# Patient Record
Sex: FEMALE
[De-identification: names, ages, dates, MRNs, and addresses within clinical notes are randomized per-mention and may not be internally consistent; named-entity substitution may affect disease eponyms.]

---

## 2019-02-06 ENCOUNTER — HOSPITAL ENCOUNTER (EMERGENCY)
Dept: HOSPITAL 5 - ED | Age: 29
Discharge: HOME | End: 2019-02-06
Payer: SELF-PAY

## 2019-02-06 VITALS — DIASTOLIC BLOOD PRESSURE: 110 MMHG | SYSTOLIC BLOOD PRESSURE: 132 MMHG

## 2019-02-06 DIAGNOSIS — S39.012A: Primary | ICD-10-CM

## 2019-02-06 DIAGNOSIS — F17.200: ICD-10-CM
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
## 2019-02-06 DIAGNOSIS — Z88.2: ICD-10-CM

## 2019-02-06 LAB
BILIRUB UR QL STRIP: (no result)
BLOOD UR QL VISUAL: (no result)
MUCOUS THREADS #/AREA URNS HPF: (no result) /HPF
PH UR STRIP: 5 [PH] (ref 5–7)
PROT UR STRIP-MCNC: (no result) MG/DL
RBC #/AREA URNS HPF: 10 /HPF (ref 0–6)
UROBILINOGEN UR-MCNC: 4 MG/DL (ref ?–2)
WBC #/AREA URNS HPF: 2 /HPF (ref 0–6)

## 2019-02-06 PROCEDURE — 81001 URINALYSIS AUTO W/SCOPE: CPT

## 2019-02-06 PROCEDURE — 72100 X-RAY EXAM L-S SPINE 2/3 VWS: CPT

## 2019-02-06 NOTE — EMERGENCY DEPARTMENT REPORT
ED Back Pain/Injury HPI





- General


Chief Complaint: Back Pain/Injury


Stated Complaint: BACK PAIN


Time Seen by Provider: 19 18:51


Source: patient


Limitations: No Limitations





- History of Present Illness


Initial Comments: 


Patient's a 71-year-old white female who presents for low back pain right the 

past month patient denies fall or injury or trauma denies dysuria frequency or 

urgency patient does have 3 small children that she has to carry upstairs 

numbness or weakness no paralysis loss or decrease in bowel or bladder function 

was a lumbar series x-ray ordered in triage were UA is a period was 2 weeks ago 

will rule out UTI and treated appropriately





MD Complaint: back pain


Onset/Timin


-: month(s)


Similar Symptoms Previously: Yes


Place: home


Radiation: buttocks


Severity: moderate


Severity scale (0 -10): 4


Quality: aching


Consistency: intermittent


Improves With: none


Worsens With: movement, sitting upright, walking


Context: while lifting, turning/twisting, bending


Associated Symptoms: denies: weakness, numbness, difficulty urinating, 

incontinence, fever/chills, constipation





- Related Data


                                  Previous Rx's











 Medication  Instructions  Recorded  Last Taken  Type


 


Cyclobenzaprine [Flexeril] 10 mg PO TID PRN #30 tablet 19 Unknown Rx


 


Menthol/Camphor [Tiger Balm 1 applicatio TD QID PRN #1 tube 19 Unknown Rx





Ointment]    


 


Naproxen [Naprosyn TAB] 500 mg PO BID PRN #30 tablet 19 Unknown Rx











                                    Allergies











Allergy/AdvReac Type Severity Reaction Status Date / Time


 


Sulfa (Sulfonamide Allergy  Unknown Verified 18 13:46





Antibiotics)     














ED Review of Systems


ROS: 


Stated complaint: BACK PAIN


Other details as noted in HPI





Constitutional: denies: chills, fever


Eyes: denies: eye pain, eye discharge, vision change


ENT: denies: ear pain, throat pain


Respiratory: denies: cough, shortness of breath, wheezing


Cardiovascular: denies: chest pain, palpitations


Endocrine: no symptoms reported


Gastrointestinal: denies: abdominal pain, nausea, vomiting, diarrhea, 

constipation


Genitourinary: denies: urgency, dysuria, frequency, hematuria, discharge, 

abnormal menses, dyspareunia


Musculoskeletal: back pain.  denies: joint swelling, arthralgia


Skin: denies: rash, lesions


Neurological: denies: headache, weakness, paresthesias


Psychiatric: denies: anxiety, depression


Hematological/Lymphatic: denies: easy bleeding, easy bruising





ED Past Medical Hx





- Social History


Smoking Status: Current Every Day Smoker


Substance Use Type: None





- Medications


Home Medications: 


                                Home Medications











 Medication  Instructions  Recorded  Confirmed  Last Taken  Type


 


Cyclobenzaprine [Flexeril] 10 mg PO TID PRN #30 tablet 19  Unknown Rx


 


Menthol/Camphor [Tiger Balm 1 applicatio TD QID PRN #1 tube 19  Unknown Rx





Ointment]     


 


Naproxen [Naprosyn TAB] 500 mg PO BID PRN #30 tablet 19  Unknown Rx














ED Physical Exam





- General


Limitations: No Limitations


General appearance: alert, in no apparent distress





- Head


Head exam: Present: atraumatic, normocephalic





- Eye


Eye exam: Present: normal appearance, PERRL, EOMI


Pupils: Present: normal accommodation





- ENT


ENT exam: Present: mucous membranes moist





- Neck


Neck exam: Present: normal inspection.  Absent: tenderness, full ROM, 

lymphadenopathy, thyromegaly





- Respiratory


Respiratory exam: Present: normal lung sounds bilaterally.  Absent: respiratory 

distress, wheezes, stridor





- Cardiovascular


Cardiovascular Exam: Present: regular rate, normal rhythm, normal heart sounds. 

Absent: systolic murmur, diastolic murmur, rubs, gallop





- GI/Abdominal


GI/Abdominal exam: Present: soft, normal bowel sounds.  Absent: tenderness, 

bruit, hernia





- Rectal


Rectal exam: Present: deferred





- Extremities Exam


Extremities exam: Present: normal inspection, full ROM, normal capillary refill.

 Absent: tenderness, pedal edema, joint swelling, calf tenderness





- Back Exam


Back exam: Present: normal inspection, full ROM, tenderness (right lumbar muscle

tenderness to deep palpation no posterior vertebral point tenderenss rom intact 

unrestricted. ), muscle spasm.  Absent: CVA tenderness (R), CVA tenderness (L), 

paraspinal tenderness, vertebral tenderness, rash noted





- Neurological Exam


Neurological exam: Present: alert, oriented X3, CN II-XII intact, normal gait





- Expanded Neurological Exam


  ** Expanded


Patient oriented to: Present: person, place, time


Speech: Present: fluid speech


Cerebellar function: Finger to Nose: Normal, Heel to Shin: Normal, Romberg: 

Normal


Upper motor neuron: [place] Neglect: Normal, Pronator Drift: Normal, Babinski Sign:

Normal, Sensory Extinction: Normal


Sensory exam: Lower Extremity Light Touch: Normal, Lower Extremity Pin Prick: 

Normal, Lower Extremity Temperature: Normal, LE 2 Point Discrimination: Normal


Motor strength exam: RUE: 5, LUE: 5, RLE: 5, LLE: 5


DTR: knee (R): 2+, knee (L): 2+, ankle (R): 2+, ankle (L): 2+


Best Eye Response ([place]): (4) open spontaneously


Best Motor Response ([place]): (6) obeys commands


Best Verbal Response ([place]): (5) oriented


[place] Total: 15





- Psychiatric


Psychiatric exam: Present: normal affect, normal mood





- Skin


Skin exam: Present: warm, dry, intact, normal color.  Absent: rash





ED Course


                                   Vital Signs











  19





  18:53 19:59


 


Temperature 98.3 F 


 


Pulse Rate 73 


 


Respiratory 16 18





Rate  


 


Blood Pressure 132/110 


 


O2 Sat by Pulse 99 





Oximetry  














ED Medical Decision Making





- Lab Data








Labs











  19





  23:20


 


Urine Color  Adela


 


Urine Turbidity  Clear


 


Urine pH  5.0


 


Ur Specific Gravity  1.035 H


 


Urine Protein  <15 mg/dl


 


Urine Glucose (UA)  Neg


 


Urine Ketones  Tr


 


Urine Blood  Neg


 


Urine Nitrite  Neg


 


Urine Bilirubin  Sm


 


Urine Ictotest  Negative


 


Urine Urobilinogen  4.0


 


Ur Leukocyte Esterase  Sm


 


Urine WBC (Auto)  2.0


 


Urine RBC (Auto)  10.0


 


U Epithel Cells (Auto)  3.0


 


Urine Mucus  2+














- Radiology Data


Radiology results: report reviewed, image reviewed


FINAL REPORT 





PROCEDURE: XR SPINE LUMBOSACRAL 2-3V 





TECHNIQUE: Lumbosacral spine, three views 





HISTORY: low back pain 





COMPARISON: No prior studies are available for comparison. 





FINDINGS: 


No scoliosis. The vertebral body heights and alignment are maintained. Disc 

spaces are preserved. 





IMPRESSION: 


No acute osseous abnormality is identified 











Transcribed By: [facility] 


Dictated By: ALPHONSE LAURENT M.D. 


Electronically Authenticated By: ALPHONSE LAURENT M.D. 


Signed Date/Time: 19 














- Medical Decision Making


This is a  Lumbar strain urine is normal  x-ray no fracture no soft tissue 

abnormality 


Critical care attestation.: 


If time is entered above; I have spent that time in minutes in the direct care 

of this critically ill patient, excluding procedure time.








ED Disposition


Clinical Impression: 


Lumbar strain


Qualifiers:


 Encounter type: initial encounter Qualified Code(s): S39.012A - Strain of 

muscle, fascia and tendon of lower back, initial encounter





Disposition:  TO HOME OR SELFCARE


Is pt being admited?: No


Does the pt Need Aspirin: No


Condition: Stable


Instructions:  Low Back Strain (ED), Core Strengthening Exercises (GEN)


Prescriptions: 


Cyclobenzaprine [Flexeril] 10 mg PO TID PRN #30 tablet


 PRN Reason: Muscle Spasm


Menthol/Camphor [Tiger Balm Ointment] 1 applicatio TD QID PRN #1 tube


 PRN Reason: pain


Naproxen [Naprosyn TAB] 500 mg PO BID PRN #30 tablet


 PRN Reason: pain


Referrals: 


[facility] [Outside] - 3-5 Days


Forms:  Work/School Release Form(ED)


Time of Disposition: 23:49

## 2019-02-06 NOTE — EMERGENCY DEPARTMENT REPORT
Blank Doc





- Documentation


Documentation: 





28 y.o. female presents with low back pain radiating down both legs. Taking na

proxen without relief.  History of sciatica.


 No other cc.


XR ordered


[facility] for evaluation

## 2019-02-06 NOTE — XRAY REPORT
FINAL REPORT



PROCEDURE:  XR SPINE LUMBOSACRAL 2-3V



TECHNIQUE:  Lumbosacral spine, three views



HISTORY:  low back pain 



COMPARISON:  No prior studies are available for comparison.



FINDINGS:  

No scoliosis. The vertebral body heights and alignment are maintained. Disc spaces are preserved.



IMPRESSION:  

No acute osseous abnormality is identified

## 2019-02-25 ENCOUNTER — HOSPITAL ENCOUNTER (EMERGENCY)
Dept: HOSPITAL 5 - ED | Age: 29
Discharge: LEFT BEFORE DISCHARGE | End: 2019-02-25
Payer: SELF-PAY

## 2019-02-25 NOTE — EMERGENCY DEPARTMENT REPORT
Blank Doc





- Documentation


Documentation: 





This is a 28-year-old female that presents with chronic lower back pain.  Denies

any injuries or trauma.  Denies any urinary symptoms.





This initial assessment diagnostic orders/clinical plan/treatment(s) is/are 

subject to change based on patient's health status, clinical progression and re-

assessment by fellow clinical providers in the ED.  Further treatment and workup

at subsequent clinical providers discretion.  Patient/guardians urged not to 

elope from ED s their condition may be serious if not clinically assessed and 

managed.  Initial orders include:


1-Patient sent to [facility] for further evaluation and treatment